# Patient Record
Sex: MALE | Race: WHITE | NOT HISPANIC OR LATINO | Employment: FULL TIME | URBAN - METROPOLITAN AREA
[De-identification: names, ages, dates, MRNs, and addresses within clinical notes are randomized per-mention and may not be internally consistent; named-entity substitution may affect disease eponyms.]

---

## 2019-01-19 ENCOUNTER — HOSPITAL ENCOUNTER (OUTPATIENT)
Dept: OTHER | Facility: HOSPITAL | Age: 69
Discharge: HOME | End: 2019-01-19

## 2019-01-19 LAB
ALT SERPL-CCNC: 26 U/L (ref 10–40)
ANION GAP SERPL CALC-SCNC: 14 MMOL/L (ref 8–19)
AST SERPL-CCNC: 22 U/L (ref 15–50)
BUN SERPL-MCNC: 16 MG/DL (ref 5–25)
BUN/CREAT SERPL: 14 {RATIO} (ref 6–20)
CALCIUM SERPL-MCNC: 9.6 MG/DL (ref 8.7–10.4)
CHLORIDE SERPL-SCNC: 105 MMOL/L (ref 99–111)
CONV CO2: 26 MMOL/L (ref 22–32)
CREAT UR-MCNC: 1.11 MG/DL (ref 0.7–1.2)
GFR SERPLBLD BASED ON 1.73 SQ M-ARVRAT: >60 ML/MIN/{1.73_M2}
GLUCOSE SERPL-MCNC: 97 MG/DL (ref 70–99)
OSMOLALITY SERPL CALC.SUM OF ELEC: 293 MOSM/KG (ref 273–304)
POTASSIUM SERPL-SCNC: 4.2 MMOL/L (ref 3.5–5.3)
SODIUM SERPL-SCNC: 141 MMOL/L (ref 135–147)
T4 FREE SERPL-MCNC: 1.4 NG/DL (ref 0.9–1.8)
TSH SERPL-ACNC: 0.89 M[IU]/L (ref 0.27–4.2)

## 2019-06-13 ENCOUNTER — OFFICE VISIT CONVERTED (OUTPATIENT)
Dept: SURGERY | Facility: CLINIC | Age: 69
End: 2019-06-13
Attending: NURSE PRACTITIONER

## 2019-06-29 ENCOUNTER — HOSPITAL ENCOUNTER (OUTPATIENT)
Dept: OTHER | Facility: HOSPITAL | Age: 69
Discharge: HOME | End: 2019-06-29

## 2019-06-29 LAB
CHOLEST SERPL-MCNC: 154 MG/DL (ref 107–200)
CHOLEST/HDLC SERPL: 3.9 {RATIO} (ref 3–6)
HDLC SERPL-MCNC: 39 MG/DL (ref 40–60)
LDLC SERPL CALC-MCNC: 96 MG/DL (ref 70–100)
T4 FREE SERPL-MCNC: 1.2 NG/DL (ref 0.9–1.8)
TRIGL SERPL-MCNC: 95 MG/DL (ref 40–150)
TSH SERPL-ACNC: 1.61 M[IU]/L (ref 0.27–4.2)
VLDLC SERPL-MCNC: 19 MG/DL (ref 5–37)

## 2019-10-09 ENCOUNTER — PROCEDURE VISIT CONVERTED (OUTPATIENT)
Dept: SURGERY | Facility: CLINIC | Age: 69
End: 2019-10-09
Attending: SURGERY

## 2019-10-09 ENCOUNTER — HOSPITAL ENCOUNTER (OUTPATIENT)
Dept: SURGERY | Facility: HOSPITAL | Age: 69
Setting detail: HOSPITAL OUTPATIENT SURGERY
Discharge: HOME | End: 2019-10-09
Attending: SURGERY

## 2020-06-12 ENCOUNTER — HOSPITAL ENCOUNTER (OUTPATIENT)
Dept: PREADMISSION TESTING | Facility: HOSPITAL | Age: 70
Discharge: HOME | End: 2020-06-12
Attending: UROLOGY

## 2020-06-14 LAB — SARS-COV-2 RNA SPEC QL NAA+PROBE: NOT DETECTED

## 2020-06-16 ENCOUNTER — HOSPITAL ENCOUNTER (OUTPATIENT)
Dept: PERIOP | Facility: HOSPITAL | Age: 70
Setting detail: HOSPITAL OUTPATIENT SURGERY
Discharge: HOME | End: 2020-06-16
Attending: UROLOGY

## 2020-06-24 ENCOUNTER — OFFICE VISIT CONVERTED (OUTPATIENT)
Dept: UROLOGY | Facility: CLINIC | Age: 70
End: 2020-06-24
Attending: UROLOGY

## 2020-12-12 ENCOUNTER — HOSPITAL ENCOUNTER (OUTPATIENT)
Dept: PREADMISSION TESTING | Facility: HOSPITAL | Age: 70
Discharge: HOME | End: 2020-12-12
Attending: UROLOGY

## 2020-12-13 LAB — SARS-COV-2 RNA SPEC QL NAA+PROBE: NOT DETECTED

## 2020-12-17 ENCOUNTER — HOSPITAL ENCOUNTER (OUTPATIENT)
Dept: PERIOP | Facility: HOSPITAL | Age: 70
Setting detail: HOSPITAL OUTPATIENT SURGERY
Discharge: HOME | End: 2020-12-17
Attending: UROLOGY

## 2020-12-23 ENCOUNTER — TELEPHONE CONVERTED (OUTPATIENT)
Dept: UROLOGY | Facility: CLINIC | Age: 70
End: 2020-12-23
Attending: UROLOGY

## 2020-12-28 ENCOUNTER — HOSPITAL ENCOUNTER (OUTPATIENT)
Dept: GENERAL RADIOLOGY | Facility: HOSPITAL | Age: 70
Discharge: HOME | End: 2020-12-28
Attending: UROLOGY

## 2021-05-13 NOTE — PROGRESS NOTES
Progress Note      Patient Name: Andrew Garcia   Patient ID: [id number]   Sex: Male   YOB: 1950    Primary Care Provider: Yin Burks MD   Referring Provider: Tino Metz MD    Visit Date: June 24, 2020    Provider: Elvira Hill MD   [facility]   Location Address: [address] [place] [address]  [id number]   Location Phone: [phone]          History Of Present Illness  The patient returns for a scheduled post-operative visit after undergoing left ureteroscopy and laser litho and left ureteroscopy and stone basket extraction for left ureteral calculus one week ago. A double J stent was inserted but has been removed by patient at home.   Since the procedure, the patient has had no significant problems.       Past Medical History  Cataract; Colon Polyps; Glaucoma; Heart Disease; Hemorrhoids, Unspecified, without Complications; High cholesterol; Hypothyroidism; Kidney stone; Thyroid disorder         Past Surgical History  Colonoscopy; Cystoscopy and ureteroscopy with stent placement; EYE SURGERY; Glaucoma surgery; Hemorrhoidectomy; Hernia; Inguinal Hernia Repair         Medication List  atorvastatin oral; Flomax 0.4 mg oral capsule; hydrocodone-acetaminophen 7.5-325 mg oral tablet; Synthroid oral         Allergy List  NO KNOWN DRUG ALLERGIES       Allergies Reconciled  Family Medical History  Heart Disease; Cancer, Unspecified; Family history of colon cancer; Family history of breast cancer         Social History  Alcohol (Never); Caffeine (Current some day); Second hand smoke exposure (Never); Tobacco (Never)         Review of Systems  · Constitutional  o Denies  o : fever, chills  · Gastrointestinal  o Denies  o : nausea, vomiting, change in abdominal girth, diarrhea, constipation, blood in stools  · Genitourinary  o Denies  o : additional symptoms, except as noted in HPI      Vitals  Date Time BP Position Site L\R Cuff Size HR RR TEMP (F) WT  HT  BMI kg/m2 BSA m2 O2  "Sat        06/24/2020 11:54 /89 Sitting       180lbs 8oz 5'  7\" 28.27 1.97           Physical Examination  · Constitutional  o Appearance  o : Well nourished, well developed patient in no acute distress. Ambulating without difficulty.              Assessment  · Left-sided Nephrolithiasis     592.0    Problems Reconciled  Plan  · Medications  o Medications have been Reconciled  o Transition of Care or Provider Policy  · Instructions  o He will need a left ESWL at some point. He will call when he is ready to schedule.             Electronically Signed by: Elvira Hill MD -Author on June 24, 2020 12:15:32 PM  "

## 2021-05-14 NOTE — PROGRESS NOTES
Progress Note      Patient Name: Andrew Garcia   Patient ID: [id number]   Sex: Male   YOB: 1950    Primary Care Provider: Pao BAXTER   Referring Provider: Tino Metz MD    Visit Date: December 23, 2020    Provider: Elvira Hill MD   Location: [facility]   Location Address: [address] [place] [address]  [id number]   Location Phone: [phone]          History Of Present Illness  The patient returns for a scheduled post-operative visit after undergoing left ESWL for left renal calculus on 12/17/2020. A double J stent was not inserted.   Since the procedure, the patient has had nausea and headache. The nausea is persistent.      He has not had a KUB to confirm stone passage.       Past Medical History  Cataract; Colon Polyps; Glaucoma; Heart disease; Hemorrhoids, Unspecified, without Complications; High cholesterol; Hypothyroidism; Kidney stone; Thyroid disorder         Past Surgical History  Colonoscopy; Cystoscopy and ureteroscopy with stent placement; EYE SURGERY; Glaucoma surgery; Hemorrhoidectomy; Hernia; Inguinal Hernia Repair         Medication List  atorvastatin oral; Flomax 0.4 mg oral capsule; hydrocodone-acetaminophen 7.5-325 mg oral tablet; Synthroid oral         Allergy List  NO KNOWN DRUG ALLERGIES       Allergies Reconciled  Family Medical History  Heart Disease; Cancer, Unspecified; Family history of colon cancer; Family history of breast cancer         Social History  Alcohol (Never); Caffeine (Current some day); Second hand smoke exposure (Never); Tobacco (Never)                 Assessment  · Calculus of kidney     592.0/N20.0      Plan  · Orders  o KUB xray [facility] ([id number]) - 592.0/N20.0 - 12/23/2021  o KUB xray [facility] ([id number]) - 592.0/N20.0 - 12/23/2020  · Medications  o Medications have been Reconciled  o Transition of Care or Provider Policy  · Instructions  o KUB to follow up to see if his stone passed from his ESWL.              Electronically Signed by: Elvira Hill MD -Author on December 23, 2020 01:23:05 PM

## 2021-05-15 VITALS
HEIGHT: 67 IN | WEIGHT: 180.5 LBS | DIASTOLIC BLOOD PRESSURE: 89 MMHG | BODY MASS INDEX: 28.33 KG/M2 | SYSTOLIC BLOOD PRESSURE: 146 MMHG

## 2021-05-15 VITALS — BODY MASS INDEX: 27.91 KG/M2 | WEIGHT: 184.12 LBS | RESPIRATION RATE: 14 BRPM | HEIGHT: 68 IN

## 2021-06-22 DIAGNOSIS — N20.0 CALCULUS OF KIDNEY: Primary | ICD-10-CM

## 2021-07-23 ENCOUNTER — HOSPITAL ENCOUNTER (OUTPATIENT)
Dept: CT IMAGING | Facility: HOSPITAL | Age: 71
Discharge: HOME | End: 2021-07-23

## 2021-07-23 DIAGNOSIS — N20.0 CALCULUS OF KIDNEY: Primary | ICD-10-CM

## 2021-07-23 DIAGNOSIS — N20.0 CALCULUS OF KIDNEY: ICD-10-CM

## 2021-07-26 ENCOUNTER — TELEPHONE (OUTPATIENT)
Dept: UROLOGY | Facility: CLINIC | Age: 71
End: 2021-07-26

## 2021-07-26 NOTE — TELEPHONE ENCOUNTER
LMOM for patient to call back. I got his CT rescheduled for 8/4/21 arriving at 9:15 AM to register for 9:30 AM appointment at [facility]. Nothing to eat/drink 2 hours prior to appointment. I have not scheduled his follow up appointment, but see if he wants to come in on 8/6/21 @ 9:15 AM.     Please apologize again for the error with scheduling the other CT.

## 2021-07-27 NOTE — TELEPHONE ENCOUNTER
Patient called and I gave him the CT appointment.  He cannot do that date.  He can only do 08/20 or 08/27 or a Friday in September.  Asked for it to be rescheduled and he will need the appointment with Dr. Hill scheduled. He asked for Joyce to call him.

## 2021-07-28 NOTE — TELEPHONE ENCOUNTER
LMOM for patient to call back. I rescheduled his CT to 8/20/2021 arriving at 8:30 AM to register for 8:45 AM appointment at [facility]. Nothing to eat/drink two hours prior.     I also scheduled his follow up with Dr. Hill on 8/27/21 at 10:15 AM.     Please let me know if these dates/times are not convenient for him.    Pictures viewed. Slight yellow hue to areas, concerning for possible early impetigo. Please schedule today for appointment with me. 15 minute OV please.

## 2021-07-30 ENCOUNTER — APPOINTMENT (OUTPATIENT)
Dept: CT IMAGING | Facility: HOSPITAL | Age: 71
End: 2021-07-30

## 2021-08-04 ENCOUNTER — APPOINTMENT (OUTPATIENT)
Dept: CT IMAGING | Facility: HOSPITAL | Age: 71
End: 2021-08-04

## 2021-10-01 ENCOUNTER — TELEPHONE (OUTPATIENT)
Dept: SURGERY | Facility: CLINIC | Age: 71
End: 2021-10-01

## 2021-10-01 NOTE — TELEPHONE ENCOUNTER
Patient calling to get his CT Scan scheduled. CB#[phone] ok to leave a detailed message on this number. He want a Friday, but not this Friday because he has to work.

## 2021-10-22 ENCOUNTER — HOSPITAL ENCOUNTER (OUTPATIENT)
Dept: CT IMAGING | Facility: HOSPITAL | Age: 71
Discharge: HOME | End: 2021-10-22
Admitting: UROLOGY

## 2021-10-22 DIAGNOSIS — N20.0 CALCULUS OF KIDNEY: ICD-10-CM

## 2021-10-22 PROCEDURE — 74176 CT ABD & PELVIS W/O CONTRAST: CPT

## 2021-11-05 ENCOUNTER — TELEPHONE (OUTPATIENT)
Dept: UROLOGY | Facility: CLINIC | Age: 71
End: 2021-11-05

## 2021-11-05 NOTE — TELEPHONE ENCOUNTER
Per - patient will need appointment to discuss results (not sure why appt was made when his CT scan was scheduled). She has not seen patient in a year. Sometime in December is fine.

## 2022-01-28 ENCOUNTER — OFFICE VISIT (OUTPATIENT)
Dept: UROLOGY | Facility: CLINIC | Age: 72
End: 2022-01-28

## 2022-01-28 VITALS
SYSTOLIC BLOOD PRESSURE: 156 MMHG | WEIGHT: 182.4 LBS | DIASTOLIC BLOOD PRESSURE: 73 MMHG | BODY MASS INDEX: 28.63 KG/M2 | HEIGHT: 67 IN

## 2022-01-28 DIAGNOSIS — N20.0 RENAL STONES: Primary | ICD-10-CM

## 2022-01-28 PROCEDURE — 99213 OFFICE O/P EST LOW 20 MIN: CPT | Performed by: UROLOGY

## 2022-01-28 RX ORDER — ERGOCALCIFEROL 1.25 MG/1
1 CAPSULE ORAL WEEKLY
COMMUNITY
End: 2023-03-06

## 2022-01-28 RX ORDER — ATORVASTATIN CALCIUM 20 MG/1
20 TABLET, FILM COATED ORAL DAILY
COMMUNITY
Start: 2021-12-26

## 2022-01-28 RX ORDER — ACETAMINOPHEN 325 MG/1
1-2 TABLET ORAL AS NEEDED
COMMUNITY

## 2022-01-28 RX ORDER — SILDENAFIL 100 MG/1
1 TABLET, FILM COATED ORAL DAILY
COMMUNITY

## 2022-01-28 RX ORDER — TAMSULOSIN HYDROCHLORIDE 0.4 MG/1
1 CAPSULE ORAL DAILY
COMMUNITY

## 2022-01-28 RX ORDER — LEVOTHYROXINE SODIUM 0.15 MG/1
150 TABLET ORAL DAILY
COMMUNITY
Start: 2022-01-11

## 2022-01-28 NOTE — PROGRESS NOTES
"Chief Complaint  Follow-up (h/o kidney stones)    Subjective          Andrew Garcia presents to [facility]  History of Present Illness   The patient has consented to being recorded using BO.    Mr. Garcia is here for follow-up for kidney stones. His last treatment for stones was on 12/17/2020. Recent CT scan renal stone protocol showed multiple bilateral non-obstructing stones in both the right and left kidney. There were no ureteral stones seen. The largest stone was in the lower pole of the left kidney and measured 9 mm. Bladder was normal. I reviewed his CT scan today.    The patient reports that he is tired. He denies passing any stones to his knowledge. He denies having any problems or attacks.    Objective   Vital Signs:   /73   Ht 170.2 cm (67\")   Wt 82.7 kg (182 lb 6.4 oz)   BMI 28.57 kg/m²     Physical Exam  Vitals and nursing note reviewed.   Constitutional:       Appearance: Normal appearance. He is well-developed.   Pulmonary:      Effort: Pulmonary effort is normal.      Breath sounds: Normal air entry.   Neurological:      Mental Status: He is alert and oriented to person, place, and time.      Motor: Motor function is intact.   Psychiatric:         Mood and Affect: Mood normal.         Behavior: Behavior normal.        Result Review :   The following data was reviewed by: Elvira Hill MD on 01/28/2022:    Data reviewed: Radiologic studies CT scan:           CONCLUSION FROM CT OF ABDOMEN AND PELVIS PERFORMED ON 10/22/2021:  1. Multiple bilateral nonobstructing renal stones.  No evidence of obstructive uropathy.  2. Diverticulosis  3. Extensive coronary atherosclerotic calcification  4. Scoliosis with advanced multilevel degenerative disc and facet disease.        Assessment and Plan    Diagnoses and all orders for this visit:    1. Renal stones (Primary)  Assessment & Plan:  - His stones are stable at this point. The largest is in the lower pole of the " left, is 9 mm and he has up to 5 mm on the right side.  - Will order KUB in 7 to 8 months and follow up at that time.    Orders:  -     XR Abdomen KUB; Future          Follow Up   Return in about 10 months (around 11/28/2022) for Next scheduled follow up, KUB prior.  Patient was given instructions and counseling regarding his condition or for health maintenance advice. Please see specific information pulled into the AVS if appropriate.     Transcribed from ambient dictation for Elvira Hill MD by Andreea Moreno.   01/28/22   17:37 EST    Patient verbalized consent to the visit recording.  I have personally performed the services described in this document as transcribed by the above individual, and it is both accurate and complete.  Elvira Hill MD  1/31/2022  13:37 EST

## 2022-01-28 NOTE — ASSESSMENT & PLAN NOTE
- His stones are stable at this point. The largest is in the lower pole of the left, is 9 mm and he has up to 5 mm on the right side.  - Will order KUB in 7 to 8 months and follow up at that time.

## 2022-11-15 ENCOUNTER — TELEPHONE (OUTPATIENT)
Dept: UROLOGY | Facility: CLINIC | Age: 72
End: 2022-11-15

## 2022-11-15 NOTE — TELEPHONE ENCOUNTER
LVM for pt to call office bax[facility] to let us know if he would like to set up and appt and if he is going to do his KUB or not.

## 2022-12-29 ENCOUNTER — TELEPHONE (OUTPATIENT)
Dept: UROLOGY | Facility: CLINIC | Age: 72
End: 2022-12-29

## 2022-12-29 NOTE — TELEPHONE ENCOUNTER
Called and spoke to pt about his overdue KUB order and to see if he wanted to schedule his annual follow up with Dr Hill. He states he would like to schedule an appt and to get his kub done because he states his kidney stone has not moved and would like to see if he grew anymore he states he is not in any pain but wanted to still get it checked. We did get him a follow up appt for 3/6/23 at 3:15pm with Dr Hill.

## 2023-03-02 ENCOUNTER — TELEPHONE (OUTPATIENT)
Dept: UROLOGY | Facility: CLINIC | Age: 73
End: 2023-03-02
Payer: COMMERCIAL

## 2023-03-02 NOTE — TELEPHONE ENCOUNTER
LMOM that patient needs to have his KUB completed prior to his appointment on Monday with Dr. Hill.

## 2023-03-03 ENCOUNTER — HOSPITAL ENCOUNTER (OUTPATIENT)
Dept: GENERAL RADIOLOGY | Facility: HOSPITAL | Age: 73
Discharge: HOME | End: 2023-03-03
Admitting: UROLOGY
Payer: COMMERCIAL

## 2023-03-03 DIAGNOSIS — N20.0 RENAL STONES: ICD-10-CM

## 2023-03-03 PROCEDURE — 74018 RADEX ABDOMEN 1 VIEW: CPT

## 2023-03-06 ENCOUNTER — OFFICE VISIT (OUTPATIENT)
Dept: UROLOGY | Facility: CLINIC | Age: 73
End: 2023-03-06
Payer: COMMERCIAL

## 2023-03-06 VITALS — HEIGHT: 67 IN | WEIGHT: 183.4 LBS | BODY MASS INDEX: 28.79 KG/M2

## 2023-03-06 DIAGNOSIS — N20.0 RENAL STONES: Primary | ICD-10-CM

## 2023-03-06 PROCEDURE — 99213 OFFICE O/P EST LOW 20 MIN: CPT | Performed by: UROLOGY

## 2023-03-06 NOTE — PROGRESS NOTES
"Chief Complaint  Renal stones    Subjective          Andrew Santino Garcia presents to [facility]  History of Present Illness    The patient presents today for a follow up. He is accompanied by an adult female.    The patient reports that he is doing well.    A review of systems was completed and positive findings are noted in the HPI.    Objective   Vital Signs:   Ht 170.2 cm (67\")   Wt 83.2 kg (183 lb 6.4 oz)   BMI 28.72 kg/m²       Physical Exam  Vitals and nursing note reviewed.   Constitutional:       Appearance: Normal appearance. He is well-developed.   Pulmonary:      Effort: Pulmonary effort is normal.      Breath sounds: Normal air entry.   Neurological:      Mental Status: He is alert and oriented to person, place, and time.      Motor: Motor function is intact.   Psychiatric:         Mood and Affect: Mood normal.         Behavior: Behavior normal.          Result Review :   The following data was reviewed by: Elvira Hill MD on 03/06/2023:      Data reviewed: Radiologic studies KUB:   Study Result    Narrative & Impression   PROCEDURE:  XR ABDOMEN KUB     COMPARISON: [facility], CR, ABDOMEN SINGLE AP, 12/28/2020, 10:44.     INDICATIONS:  ANNUAL EXAM FOR KIDNEY STONE     FINDINGS:          There are at least 3 right renal calculi measuring 6, 4 and 3 mm.  There is an 11 mm calculus over   the lower pole region of the left kidney.  There is a nonspecific gas pattern.  There is scoliosis   of the lumbar spine convex leftward.     IMPRESSION:               Stable renal calculi.            TONY ANDRES MD         Electronically Signed and Approved By: TONY ANDRES MD on 3/04/2023 at 8:59                 Assessment and Plan    Diagnoses and all orders for this visit:    1. Renal stones (Primary)  -     XR Abdomen KUB; Future    1. Kidney stones.  - We will see him back in 1 year or sooner if needed.  - We will get a KUB prior to that " visit.          Follow Up       No follow-ups on file.  Patient was given instructions and counseling regarding his condition or for health maintenance advice. Please see specific information pulled into the AVS if appropriate.     Transcribed from ambient dictation for Elvira Hill MD by Nena Stevens, Quality .  03/06/23   15:43 EST    Patient or patient representative verbalized consent to the visit recording.  I have personally performed the services described in this document as transcribed by the above individual, and it is both accurate and complete.  Elvira Hill MD  3/7/2023  16:25 EST

## 2023-06-19 ENCOUNTER — OFFICE VISIT (OUTPATIENT)
Dept: SLEEP MEDICINE | Facility: HOSPITAL | Age: 73
End: 2023-06-19
Payer: COMMERCIAL

## 2023-06-19 VITALS
HEIGHT: 67 IN | DIASTOLIC BLOOD PRESSURE: 87 MMHG | HEART RATE: 80 BPM | OXYGEN SATURATION: 97 % | BODY MASS INDEX: 28.5 KG/M2 | SYSTOLIC BLOOD PRESSURE: 147 MMHG | WEIGHT: 181.6 LBS

## 2023-06-19 DIAGNOSIS — G47.10 HYPERSOMNIA: ICD-10-CM

## 2023-06-19 DIAGNOSIS — R06.83 SNORING: ICD-10-CM

## 2023-06-19 DIAGNOSIS — R06.81 WITNESSED EPISODE OF APNEA: ICD-10-CM

## 2023-06-19 DIAGNOSIS — E66.3 OVERWEIGHT (BMI 25.0-29.9): Primary | ICD-10-CM

## 2023-06-19 PROCEDURE — G0463 HOSPITAL OUTPT CLINIC VISIT: HCPCS

## 2023-06-19 PROCEDURE — 99204 OFFICE O/P NEW MOD 45 MIN: CPT | Performed by: NURSE PRACTITIONER

## 2023-06-19 RX ORDER — ERGOCALCIFEROL 1.25 MG/1
1 CAPSULE ORAL WEEKLY
COMMUNITY
Start: 2023-04-21

## 2023-06-19 RX ORDER — LEVOTHYROXINE SODIUM 137 MCG
1 TABLET ORAL DAILY
COMMUNITY
Start: 2023-05-30

## 2023-06-19 NOTE — PROGRESS NOTES
[facility] [address] [address]  Phone: [phone]  Fax: [phone]      Andrew Garcia  [id number]   1950  73 y.o.  male      Referring Provider and PCP: Pao Khan PA-C    Type of service: Initial Sleep Medicine Consult.  Date of service: 6/19/2023          CHIEF COMPLAINT: Witnessed apnea      HISTORY OF PRESENT ILLNESS:  Thank you for asking us to see Andrew Garcia.  Andrew Garcia 73 y.o. was seen today on 6/19/2023 at [facility].  Patient presents today with symptoms of snoring, non-restorative sleep, and witnessed apneas.  The symptoms are chronic and persistent in nature.  Patient has no history of tonsillectomy, adenoidectomy, nasal surgery, UPPP.  Gets 7-8 hours of sleep a night.  Usually takes 1 nap in the afternoon on weekdays, 2-3 on weekends.      SLEEP HISTORY:  Sleep schedule:  Bedtime: 9 to 11 PM  Wake time: 4:15 AM weekdays, 7 to 8 AM weekends  Normally takes about 30 minutes to fall asleep  Average hours of sleep: 7-8  Number of naps per day: 1 on weekdays, 2-3 on weekends    Symptoms:   In addition to the above, patient reports the following associated symptoms:  Have you ever awakened gasping for breath, coughing, choking: No   Change in weight:  No   Morning headaches:  No   Awaken with a sore throat or dry mouth:  No   Leg jerking at night:  Yes   Creepy crawly feeling in legs/urge to move legs: No   Teeth grinding: No   Have you ever awakened at night with a sour taste or burning sensation in your chest:  No   Do you have muscle weakness with laughing or anger:  No   Have you ever felt paralyzed while going to sleep or waking up:  No   Sleepwalking: No   Nightmares: No   Nocturia (urination at night): 1-2 times per night  Memory Problem: No     Medical Conditions (PMH):   Hypothyroidism  Vitamin D deficiency  Hyperlipidemia    Social history:  Do you drive a commercial vehicle:  No   Shift  "work:  No   Tobacco use:  No  Alcohol use: 0 per week  Caffeinated drinks: 1 per day  Occupation:     Family History (parents and siblings) (pertaining to sleep medicine):  Thyroid disorder    Medications: reviewed    Allergies:  Patient has no known allergies.      REVIEW OF SYSTEMS:  Pertinent positive symptoms are:  Snoring  Witnessed apnea  [place] Sleepiness Scale of Total score: 6   Fatigue         PHYSICAL EXAM:  CONSTITUTINONAL:   Vitals:    06/19/23 1500   BP: 147/87   Pulse: 80   SpO2: 97%   Weight: 82.4 kg (181 lb 9.6 oz)   Height: 170.2 cm (67\")    Body mass index is 28.44 kg/m².   HEAD: atraumatic, normocephalic   NOSE: nasal passages are clear  THROAT: Mallampati class IV+  NECK: Neck Circumference: 15.5 inches, trachea is midline  RESPIRATORY SYSTEM: Breath sounds are normal, breathing unlabored, no wheezing present on exam  CARDIOVASULAR SYSTEM: Regular rhythm and normal rate, no edema  NEUROLOGICAL SYSTEM: Alert and oriented x 3, normal gait  PSYCHIATRIC SYSTEM: Mood is normal/ appropriate     Office notes from care team reviewed. Office note dated 3/6/2023, reviewed.          ASSESSMENT AND PLAN:   Witnessed apnea (R06.81): patient's symptoms and physical examination are consistent with sleep apnea (G47.30).   I discussed the signs, symptoms, and pathophysiology of sleep apnea with this patient.  I also discussed the potential complications of untreated sleep apnea including but not limited to resistant hypertension, insulin resistance, pulmonary hypertension, atrial fibrillation, stroke, nonrestorative sleep with hypersomnia which can increase risk for motor vehicle accidents, etc.   Different testing methods including home-based and lab based sleep studies were discussed with this patient.   Based on patient history and physical examination, the most appropriate study is home sleep study.  The order for the sleep study is placed in [facility].  The test will be scheduled after prior " authorization has been obtained through patient's insurance.  Treatment and management will be discussed in more detail with this patient after the test is completed.  All questions were answered to patient's satisfaction.   Snoring (R06.83): snoring is the sound created by turbulent airflow vibrating upper airway soft tissue.  I have also discussed factors affecting snoring including sleep deprivation, sleeping on the back and alcohol ingestion. To minimize snoring, patient is advised to have adequate sleep, sleep on their side, and avoid alcohol and sedative medications around bedtime.   Overweight: Body mass index is 28.44 kg/m².. Patients who are overweight or obese are at increased risk of sleep apnea/ sleep disordered breathing. Weight reduction and healthy lifestyle are encouraged in overweight/ obese patients as part of a comprehensive approach to sleep apnea treatment.    Renal stones: Follows with urology    Patient will follow-up after study, 31 to 90 days after PAP therapy initiated if applicable, or contact the office sooner for questions or concerns. Patient's questions were answered.            Thank you again for asking me to consult on this patient.  Please do not hesitate to call me if you have additional questions or concerns.       Cheryl Carter, BREANNE, APRN  [facility]

## 2023-11-07 ENCOUNTER — OFFICE VISIT (OUTPATIENT)
Dept: SLEEP MEDICINE | Facility: HOSPITAL | Age: 73
End: 2023-11-07
Payer: COMMERCIAL

## 2023-11-07 VITALS
DIASTOLIC BLOOD PRESSURE: 94 MMHG | BODY MASS INDEX: 28.09 KG/M2 | HEIGHT: 67 IN | SYSTOLIC BLOOD PRESSURE: 157 MMHG | WEIGHT: 179 LBS | HEART RATE: 68 BPM | OXYGEN SATURATION: 96 %

## 2023-11-07 DIAGNOSIS — E66.3 OVERWEIGHT (BMI 25.0-29.9): ICD-10-CM

## 2023-11-07 DIAGNOSIS — G47.33 OSA (OBSTRUCTIVE SLEEP APNEA): Primary | ICD-10-CM

## 2023-11-07 PROCEDURE — G0463 HOSPITAL OUTPT CLINIC VISIT: HCPCS

## 2023-11-07 NOTE — PROGRESS NOTES
[address]  [place]   [place] [address]  Phone: [phone]  Fax: [phone]        SLEEP CLINIC FOLLOW-UP PROGRESS NOTE    Andrew Garcia  [id number]   1950  73 y.o.  male      PCP: Pao Khan PA-C    DATE OF VISIT: 11/7/2023            CHIEF COMPLAINT: Obstructive sleep apnea    HPI:  This is a 73 y.o. year old patient who presents to the clinic today for the management of obstructive sleep apnea.  Patient had a(n) home sleep study 7/2023 showing obstructive sleep apnea with AHI of 14/hr overall, 21/h when supine.  This patient is using positive airway pressure therapy with auto CPAP.  He has had a hard time adjusting to PAP therapy.  He reports he is on his fourth facemask, now using a fullface mask that goes over his nose and mouth.  He thinks that this is the best mask he has tried to date, however he does get some leakage if he sleeps on his sides which is his preferred position.  We did discuss possible CPAP pillow.  He inquired about inspire therapy.  His overall AHI on home sleep study was 14/h.  To qualify for inspire the AHI must be 15/h or more.  We did discuss that if we did an in lab sleep study this may help see if he qualifies as home study could slightly underestimate severity of sleep apnea at times.  He falls asleep okay with the CPAP but wakes up feeling like there is way too much pressure/air.  Also thinks he may have trouble breathing out at times.  EPR is only on during ramp.  [employer] did decrease ramp pressure from 6 cm H2O to 4 cm H2O.  He thinks that this was better but as soon as he would fall asleep and the pressure would increase he would wake up.  We did review pathophysiology of obstructive sleep apnea as well as potential risks of untreated sleep apnea.  Patient verbalized understanding.  He is amenable to trying CPAP again with some pressure adjustments.      MEDICATIONS: reviewed     ALLERGIES:  Patient has no known  "allergies.    SOCIAL HISTORY (habits pertaining to sleep medicine):  History tobacco use: No   History of alcohol use: 0 per week  Caffeine use: 1-2     REVIEW OF SYSTEMS:   Pertinent positive symptoms are:  [place] Sleepiness Scale :Total score: 1   Dry mouth/nose        PHYSICAL EXAMINATION:  CONSTITUTIONAL:  Vitals:    11/07/23 1300   BP: 157/94   Pulse: 68   SpO2: 96%   Weight: 81.2 kg (179 lb)   Height: 170.2 cm (67.01\")    Body mass index is 28.03 kg/m².   HEAD: atraumatic, normocephalic  RESP SYSTEM: not in respiratory distress, breathing unlabored  CARDIOVASULAR: normal rate, no edema noted   NEURO: Alert and oriented x 3, mood and affect appeared appropriate      DATA REVIEWED:  The Smart card downloaded on 11/7/2023 has been reviewed independently by me for compliance and discussed the data with the patient.   Compliance: 20%  More than 4 hr use: 0%  Average use of the device: 20 minutes per night on days used  Residual AHI: 0.6 /hr (goal < 5.0 /hr)  Mask type: Fullface  Device: ResMed air sense 11  DME: [employer]          ASSESSMENT AND PLAN:  Obstructive Sleep Apnea (G 47.33): Patient having a hard time adjusting to PAP.  Feels pressures much too high when falls asleep.  Median pressure 7.3, 95th percentile pressure is 12.3 cm H2O.  Have decreased minimum pressure to 6 cm H2O.  Have decreased maximum pressure to 9 cm H2O.  Have changed EPR to being on full-time.  The residual AHI is acceptable. The device is benefiting the patient and the device is medically necessary.  Reviewed pathophysiology of obstructive sleep apnea with patient as well as potential risks of untreated sleep apnea.  Patient will try CPAP at the above pressures.  He will contact the office if he feels we have reduced pressures too much.  He is requesting a 2-week follow-up to reevaluate at that time.  If still unable to tolerate PAP, could consider in lab polysomnogram to see if qualifies for possible inspire.  Overweight: Body mass " index is 28.03 kg/m².. Patients who are overweight or obese are at increased risk of sleep apnea/ sleep disordered breathing. Weight reduction and healthy lifestyle are encouraged in overweight/ obese patients as part of a comprehensive approach to sleep apnea treatment.    Kidney stones  Hypothyroidism   ED      Patient will follow-up in 2 weeks, per patient preference, or follow-up sooner for any issues or concerns.  Patient's questions were answered.          Thank you for allowing me to participate in the care of this patient.     Cheryl Carter DNP, APRN  [facility]

## 2023-11-28 ENCOUNTER — OFFICE VISIT (OUTPATIENT)
Dept: SLEEP MEDICINE | Facility: HOSPITAL | Age: 73
End: 2023-11-28
Payer: COMMERCIAL

## 2023-11-28 VITALS
WEIGHT: 184.7 LBS | SYSTOLIC BLOOD PRESSURE: 182 MMHG | BODY MASS INDEX: 28.99 KG/M2 | DIASTOLIC BLOOD PRESSURE: 91 MMHG | HEART RATE: 69 BPM | OXYGEN SATURATION: 97 % | HEIGHT: 67 IN

## 2023-11-28 DIAGNOSIS — G47.33 OSA (OBSTRUCTIVE SLEEP APNEA): Primary | ICD-10-CM

## 2023-11-28 DIAGNOSIS — E66.3 OVERWEIGHT (BMI 25.0-29.9): ICD-10-CM

## 2023-11-28 PROCEDURE — G0463 HOSPITAL OUTPT CLINIC VISIT: HCPCS

## 2023-11-28 NOTE — PROGRESS NOTES
[address]  [place]   [place] [address]  Phone: [phone]  Fax: [phone]        SLEEP CLINIC FOLLOW-UP PROGRESS NOTE    Andrew Garcia  [id number]   1950  73 y.o.  male      PCP: Pao Khan PA-C    DATE OF VISIT: 11/28/2023            CHIEF COMPLAINT: Obstructive sleep apnea    HPI:  This is a 73 y.o. year old patient who presents to the clinic today for the management of obstructive sleep apnea.  Patient had a(n) home sleep study 7/2023 showing obstructive sleep apnea with AHI of 14/hr overall, 21/hr when supine.  This patient is using positive airway pressure therapy with auto CPAP.   He has had a really hard time adjusting to the machine.  We tried pressure reduction last visit and he still feels he does not tolerate at all.  He declines any further pressure adjustments at this point and he declines any further attempts at trying the CPAP.  He is aware that CPAP is gold standard of treatment for sleep apnea.  At this point he wants to turn his CPAP back into the supply company.  Reiterated pathophysiology of obstructive sleep apnea as well as risks of untreated sleep apnea including but not limited to possible increased risk of car accidents, possible increased risk of abnormal heart rhythms including A-fib, possible increased risks of heart attack or stroke, etc.  He verbalized understanding but just feels he does not tolerate CPAP and does not want to try it any longer.  At this point he is not interested in inspire device candidacy and would not quite meet criteria anyway with his AHI of 14/h that we did discuss possible in lab sleep study to reevaluate.  We did also discuss positional therapy, avoiding sleeping on back, and discussed working on healthy weight loss.  He would be interested in possible oral appliance depending on pricing or if insurance will cover.      MEDICATIONS: reviewed     ALLERGIES:  Patient has no known  "allergies.    SOCIAL HISTORY (habits pertaining to sleep medicine):  History tobacco use: No   History of alcohol use: 0 per week  Caffeine use: 1-2     REVIEW OF SYSTEMS:   Pertinent positive symptoms are:  Montara Sleepiness Scale :Total score: 3   Dry mouth/nose  Anxiety        PHYSICAL EXAMINATION:  CONSTITUTIONAL:  Vitals:    11/28/23 1300   BP: (!) 182/91   Pulse: 69   SpO2: 97%   Weight: 83.8 kg (184 lb 11.2 oz)   Height: 170.2 cm (67.01\")    Body mass index is 28.92 kg/m².   BP on recheck 167/90, completely asymptomatic, reports he was rushing to get here and feels that this is why reading was high, he will recheck at home and contact PCP if still elevated, ER if symptomatic high reading.      HEAD: atraumatic, normocephalic  RESP SYSTEM: not in respiratory distress, breathing unlabored  CARDIOVASULAR: normal rate, no edema noted   NEURO: Alert and oriented x 3, mood and affect appeared appropriate      DATA REVIEWED:  The Smart card downloaded on 11/19/2023 has been reviewed independently by me for compliance and discussed the data with the patient.   Compliance: 37%  More than 4 hr use: 0%  Average use of the device: 1 hour 5 minutes per night  Residual AHI: 0.5 /hr (goal < 5.0 /hr)  Device: ResMed AirSense 11  DME: [employer]            ASSESSMENT AND PLAN:  Obstructive Sleep Apnea (G 47.33): Patient unable to tolerate CPAP therapy at this time.  He declines any further attempts at trying CPAP.  Declines any pressure adjustments or trials of alternate facemask types or referral to Dr. Crouch to help with CPAP desensitization.  He is aware that without adequate treatment of sleep apnea and without good compliance he may be at increased risk of resistant hypertension, metabolic issues, abnormal heart rhythms including atrial fibrillation, possible increased risk of stroke, cardiac issues or MI, and nonrestorative sleep with hypersomnia which could increase risk of motor vehicle accidents.  At this point he " is not quite a candidate for inspire and he is not interested in this regardless even if he were to qualify an in lab polysomnogram.  He is interested in possible oral mandibular advancement device and I have placed referral for Dr. Longoria.  Patient aware that he would need to follow-up after he receives device for follow-up sleep study to ensure device adequately treating sleep apnea.  If he changes his mind about additional attempts at trial of PAP therapy then he will notify the office.  In the meantime he will also work on some healthy weight loss and try to avoid sleeping on back.  Overweight: Body mass index is 28.92 kg/m².. Patients who are overweight or obese are at increased risk of sleep apnea/ sleep disordered breathing. Weight reduction and healthy lifestyle are encouraged in overweight/ obese patients as part of a comprehensive approach to sleep apnea treatment.    Elevated BP reading: he will discuss further with PCP.  Completely asymptomatic and BP improving on recheck but still elevated.  He feels that this was from rushing to get to the office today as he was running late.  He is aware of importance of rechecking blood pressure at home and following up with PCP on this.      Patient will follow-up as directed by dentist after receiving oral mandibular advancement device or follow-up sooner for any issues or concerns or if he changes his mind on another attempt at PAP therapy.  Patient's questions were answered.          Thank you for allowing me to participate in the care of this patient.     Cheryl Carter DNP, APRNAVNEET  [facility]

## 2024-02-26 ENCOUNTER — HOSPITAL ENCOUNTER (OUTPATIENT)
Dept: GENERAL RADIOLOGY | Facility: HOSPITAL | Age: 74
Discharge: HOME | End: 2024-02-26
Admitting: UROLOGY
Payer: COMMERCIAL

## 2024-02-26 DIAGNOSIS — N20.0 RENAL STONES: ICD-10-CM

## 2024-02-26 PROCEDURE — 74018 RADEX ABDOMEN 1 VIEW: CPT

## 2024-03-11 ENCOUNTER — OFFICE VISIT (OUTPATIENT)
Dept: UROLOGY | Facility: CLINIC | Age: 74
End: 2024-03-11
Payer: COMMERCIAL

## 2024-03-11 VITALS
HEIGHT: 67 IN | SYSTOLIC BLOOD PRESSURE: 165 MMHG | WEIGHT: 188 LBS | DIASTOLIC BLOOD PRESSURE: 92 MMHG | BODY MASS INDEX: 29.51 KG/M2

## 2024-03-11 DIAGNOSIS — N20.0 RENAL STONES: Primary | ICD-10-CM

## 2024-03-11 PROCEDURE — 99213 OFFICE O/P EST LOW 20 MIN: CPT | Performed by: UROLOGY

## 2024-03-11 NOTE — PROGRESS NOTES
"Chief Complaint  Renal stones    Subjective          Andrew Santino Garcia presents to [facility] UROLOG[facility]    History of Present Illness  Patient is here for follow-up for kidney stones.  They appear stable.  He has a 1.1 cm left lower pole renal stone that is very slowly growing.  I did take him to the operating room for this in 2020 but was unable to get the stone likely due to the location in the left lower pole.  He had a follow-up ESWL and the stone did not bring up with that either.    He has 2 other stones in his right kidney 6 and 3 mm in size.      Objective   Vital Signs:   /92   Ht 170.2 cm (67.01\")   Wt 85.3 kg (188 lb)   BMI 29.44 kg/m²       Physical Exam  Vitals and nursing note reviewed.   Constitutional:       Appearance: Normal appearance. He is well-developed.   Pulmonary:      Effort: Pulmonary effort is normal.      Breath sounds: Normal air entry.   Neurological:      Mental Status: He is alert and oriented to person, place, and time.      Motor: Motor function is intact.   Psychiatric:         Mood and Affect: Mood normal.         Behavior: Behavior normal.          Result Review :   The following data was reviewed by: Elvira Hill MD on 03/11/2024:       Results    Procedure Component Value Ref Range Date/Time   XR Abdomen KUB [[id number]] Collected: 02/26/24 0947   Order Status: Completed Updated: 02/26/24 0950   Narrative:     PROCEDURE: XR ABDOMEN KUB     COMPARISON: [facility], CT, CT ABDOMEN PELVIS STONE PROTOCOL, 10/22/2021,  7:49.  [facility], CR, XR ABDOMEN KUB, 3/03/2023, 17:10.     INDICATIONS: KIDNEY STONES. NO ABDOMEN COMPLAINTS TODAY.     FINDINGS:  BOWEL GAS PATTERN: Mild-to-moderate stool.  No dilated loops of bowel to suggest ileus or  obstruction.  CALCIFICATIONS: 1.2 x 0.8 cm cluster of nonobstructing stones in the left inferior renal pole.    Additional nonobstructing calculi measuring 5 mm in the " right upper and right lower poles.  6 mm  calcification projecting right of midline at the level of L3.  OTHER: Multilevel thoracolumbar spondylosis with mild levocurvature.      Impression:       1. At least 3 nonobstructing renal calculi measuring up to 1.2 cm in the left inferior pole.  2. 6 mm calcification just right of midline at the level of L3.  In an asymptomatic patient this  favors a phlebolith, versus less likely stone in the mid right ureter.  As indicated a dedicated  noncontrast CT of the abdomen could be considered.              NERI MATTA MD        Electronically Signed and Approved By: NERI MATTA MD on 2/26/2024 at 9:46              Assessment and Plan    Diagnoses and all orders for this visit:    1. Renal stones (Primary)  -     XR Abdomen KUB; Future    Patient will follow-up in 1 year with KUB.  That stone is in his left lower pole is growing very slowly and we discussed the treating this now versus continuing to watch it.  He would like to continue to watch it for now.  If it does need treatment he has failed ureteroscopy and ESWL in the past.  I feel like it probably failed ureteroscopy due to it being in the lower pole and was probably unable to access it.  We now have disposable scopes which should be able to get into the lower pole.  I discussed this with the patient.            Follow Up       No follow-ups on file.  Patient was given instructions and counseling regarding his condition or for health maintenance advice. Please see specific information pulled into the AVS if appropriate.

## 2024-06-06 ENCOUNTER — TELEPHONE (OUTPATIENT)
Dept: SLEEP MEDICINE | Facility: HOSPITAL | Age: 74
End: 2024-06-06
Payer: COMMERCIAL

## 2024-06-06 NOTE — TELEPHONE ENCOUNTER
Called patient to inquire about Dr. Longoria appointment, to see If he received his oral device. As he would need to follow up with Cheryl Carter and also have a repeat sleep study. I left voicemail for patient to return my call.

## 2024-06-20 ENCOUNTER — TELEPHONE (OUTPATIENT)
Dept: SLEEP MEDICINE | Facility: HOSPITAL | Age: 74
End: 2024-06-20
Payer: COMMERCIAL

## 2024-06-20 NOTE — TELEPHONE ENCOUNTER
Called patient to follow up regarding Dr. Longoria appointment. He states that at this time he doesn't feel like that is what he needs and will be in contact, If things change.